# Patient Record
(demographics unavailable — no encounter records)

---

## 2024-11-14 NOTE — PHYSICAL EXAM
[General Appearance - Alert] : alert [General Appearance - In No Acute Distress] : in no acute distress [Person] : oriented to person [Place] : oriented to place [Time] : oriented to time [5] : S1 toe walking 5/5 [Sensation Tactile Decrease] : light touch was intact [2+] : Ankle jerk left 2+ [Abnormal Walk] : normal gait [Short Term Intact] : short term memory intact [Remote Intact] : remote memory intact [Span Intact] : the attention span was normal [Concentration Intact] : normal concentrating ability [Fluency] : fluency intact [Comprehension] : comprehension intact [Current Events] : adequate knowledge of current events [Past History] : adequate knowledge of personal past history [Vocabulary] : adequate range of vocabulary [Cranial Nerves Optic (II)] : visual acuity intact bilaterally,  pupils equal round and reactive to light [Cranial Nerves Oculomotor (III)] : extraocular motion intact [Cranial Nerves Trigeminal (V)] : facial sensation intact symmetrically [Cranial Nerves Facial (VII)] : face symmetrical [Cranial Nerves Vestibulocochlear (VIII)] : hearing was intact bilaterally [Cranial Nerves Glossopharyngeal (IX)] : tongue and palate midline [Cranial Nerves Accessory (XI - Cranial And Spinal)] : head turning and shoulder shrug symmetric [Cranial Nerves Hypoglossal (XII)] : there was no tongue deviation with protrusion [Motor Tone] : muscle tone was normal in all four extremities [Motor Strength] : muscle strength was normal in all four extremities [No Muscle Atrophy] : normal bulk in all four extremities [Past-pointing] : there was no past-pointing [Tremor] : no tremor present [Beaulieu] : Beaulieu's sign was not demonstrated [FreeTextEntry8] : Ambulates with a cane even though he really does not need it

## 2024-11-14 NOTE — ASSESSMENT
[FreeTextEntry1] : Impression: 73yr old male with Chief complaint of low back pain coming from the middle going to the right side.  This pain started 3 weeks ago when he was lifting weights The pain stays in his low back. Denies any numbness or tingling of the legs or feet. Denies any motor weakness or difficulty walking.   The Naprosyn medication helped alleviate the pain.  Today his pain is a 3/10 in severity Neurological exam normal Plan: Recommend no heavey weight lifting Ok to take nsaids as needed Refer to PT for 6-8 weeks follow up prn

## 2024-11-14 NOTE — HISTORY OF PRESENT ILLNESS
[de-identified] : Mike is here for a new patient visit. Chief complaint of low back pain coming from the middle going to the right side. The pain stays in his low back. Denies any numbness or tingling of the legs or feet. Denies any motor weakness or difficulty walking. He has seen significant improvement with time.  He had a previous lumbar spine surgery by Dr. Dupont 15 years ago. Presently he is describing pain as 3 on a scale of 10 and he is taken no medication in the last few days.He had lumbar MRI scanning done at [facility] recently.  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

## 2024-11-14 NOTE — HISTORY OF PRESENT ILLNESS
[de-identified] : Mike is here for a new patient visit. Chief complaint of low back pain coming from the middle going to the right side. The pain stays in his low back. Denies any numbness or tingling of the legs or feet. Denies any motor weakness or difficulty walking. He has seen significant improvement with time.  He had a previous lumbar spine surgery by Dr. Dupont 15 years ago. Presently he is describing pain as 3 on a scale of 10 and he is taken no medication in the last few days.He had lumbar MRI scanning done at [facility] recently.  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